# Patient Record
Sex: MALE
[De-identification: names, ages, dates, MRNs, and addresses within clinical notes are randomized per-mention and may not be internally consistent; named-entity substitution may affect disease eponyms.]

---

## 2024-05-13 PROBLEM — Z00.00 ENCOUNTER FOR PREVENTIVE HEALTH EXAMINATION: Status: ACTIVE | Noted: 2024-05-13

## 2024-05-30 ENCOUNTER — APPOINTMENT (OUTPATIENT)
Dept: ORTHOPEDIC SURGERY | Facility: CLINIC | Age: 39
End: 2024-05-30
Payer: COMMERCIAL

## 2024-05-30 VITALS — BODY MASS INDEX: 34.02 KG/M2 | HEIGHT: 62.99 IN | WEIGHT: 192 LBS

## 2024-05-30 DIAGNOSIS — R20.0 ANESTHESIA OF SKIN: ICD-10-CM

## 2024-05-30 DIAGNOSIS — M54.50 LOW BACK PAIN, UNSPECIFIED: ICD-10-CM

## 2024-05-30 DIAGNOSIS — M54.2 CERVICALGIA: ICD-10-CM

## 2024-05-30 DIAGNOSIS — Z78.9 OTHER SPECIFIED HEALTH STATUS: ICD-10-CM

## 2024-05-30 DIAGNOSIS — R20.2 ANESTHESIA OF SKIN: ICD-10-CM

## 2024-05-30 PROCEDURE — 72040 X-RAY EXAM NECK SPINE 2-3 VW: CPT

## 2024-05-30 PROCEDURE — 72100 X-RAY EXAM L-S SPINE 2/3 VWS: CPT

## 2024-05-30 PROCEDURE — 99204 OFFICE O/P NEW MOD 45 MIN: CPT

## 2024-05-30 RX ORDER — MELOXICAM 15 MG/1
15 TABLET ORAL
Qty: 30 | Refills: 1 | Status: ACTIVE | COMMUNITY
Start: 2024-05-30 | End: 1900-01-01

## 2024-05-30 RX ORDER — CYCLOBENZAPRINE HYDROCHLORIDE 5 MG/1
5 TABLET, FILM COATED ORAL
Qty: 90 | Refills: 1 | Status: ACTIVE | COMMUNITY
Start: 2024-05-30 | End: 1900-01-01

## 2024-05-30 NOTE — PHYSICAL EXAM
[Normal] : Gait: normal [UE/LE] : Sensory: Intact in bilateral upper & lower extremities [0] : left brachioradialis 0 [ALL] : dorsalis pedis, posterior tibial, femoral, popliteal, and radial 2+ and symmetric bilaterally [de-identified] : Cervical spine exam NL ROM of cervical spine TTP C spine and b/l paracervicals/paraspinals. Skin intact to C-spine. No rashes, ulcers, blisters. No lymphedema. Rapid alternating movements intact. Finger to nose testing intact. Full and non-painful ROM RUE, LUE, RLE, LLE. Skin intact RUE, LUE, RLE, LLE. No rashes, blisters, ulcers. NTTP RUE, LUE, RLE, LLE. No evidence of dislocation or subluxation b/l.  Lumbar ROM:Normal TTP L spine and b/l paraspinals lumbar region Skin intact L spine. No rashes, ulcers, blisters.  No lymphedema.  Rapid alternating movements- intact Finger to nose testing- intact Full and non-painful ROM RUE, LUE, RLE and LLE. Skin intact RUE, LUE, RLE and LLE. No rashes, blisters, ulcers. NTTP RUE, LUE, RLE and LLE. No evidence of dislocation or subluxation B/L upper and lower extremities. [de-identified] : 2 views AP and Lat of Cervical Spine from occipital to C7/T1. Read and dictated by Dr. Cristian Molina Board Certified Orthopaedic surgeon  and 3 views AP and Lat, flex/ext of Lumbar Spine from [id number] . Read and dictated by Dr. Cristian Molina Board Certified Orthopaedic surgeon 5/30/2024 NL

## 2024-05-30 NOTE — DISCUSSION/SUMMARY
[de-identified] : 37 yo male with nl xrays of C and L Spine, with no relief with PT and NSAIDS, recommend MRI of  C and L Spine, hand referral, RTO after testing.   Diagnosis, prognosis, natural history and treatment was discussed with patient. Patient was advised if the following symptoms develop: chills, fever,  loss of bladder control, bowel incontinence or urinary retention, numbness/tingling or weakness is present in upper or lower extremities, to go to the nearest emergency room. This may be a new clinical condition not present at the time of the patient visit  that may lead to paralysis and/or death, Patient advised if the above symptoms developed to also call the office immediately to inform us and to go to the nearest emergency room.

## 2024-05-30 NOTE — HISTORY OF PRESENT ILLNESS
[de-identified] : 38-year-old male, [place]-speaking, presents complaining of neck and mid back pain for 2 and half years.  He is a  in a restaurant.  No injuries or trauma.  Pain is midline neck radiating into mid back region.  Sometimes he feels this on either side of his neck.  He feels some numbness and tingling in his pinky, ring, middle finger, right hand.  He states in the past he tried chiropractic care, he takes Tylenol and Aleve as needed when he feels the symptoms.  Pain is worse toward the end of his shift when he standing for long periods of time.  Denies any radiation of pain in her shoulders or upper arms. No fevers, no chills, no weight changes.  He has done PT 9 months ago for C and L Spine, for 2-3 x/week for 6 weeks, with minimal relief.  [Ataxia] : no ataxia [Incontinence] : no incontinence [Loss of Dexterity] : good dexterity [Urinary Ret.] : no urinary retention

## 2024-06-25 ENCOUNTER — APPOINTMENT (OUTPATIENT)
Dept: ORTHOPEDIC SURGERY | Facility: CLINIC | Age: 39
End: 2024-06-25